# Patient Record
Sex: MALE | Race: WHITE | NOT HISPANIC OR LATINO | Employment: FULL TIME | URBAN - METROPOLITAN AREA
[De-identification: names, ages, dates, MRNs, and addresses within clinical notes are randomized per-mention and may not be internally consistent; named-entity substitution may affect disease eponyms.]

---
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
## 2018-08-16 ENCOUNTER — OFFICE VISIT - HEALTHEAST (OUTPATIENT)
Dept: FAMILY MEDICINE | Facility: CLINIC | Age: 18
End: 2018-08-16

## 2018-08-16 DIAGNOSIS — Z00.00 ENCOUNTER FOR ANNUAL HEALTH EXAMINATION: ICD-10-CM

## 2018-08-16 DIAGNOSIS — Z00.129 ENCOUNTER FOR ROUTINE CHILD HEALTH EXAMINATION WITHOUT ABNORMAL FINDINGS: ICD-10-CM

## 2018-08-16 ASSESSMENT — MIFFLIN-ST. JEOR: SCORE: 1785.3

## 2019-06-19 ENCOUNTER — RECORDS - HEALTHEAST (OUTPATIENT)
Dept: ADMINISTRATIVE | Facility: OTHER | Age: 19
End: 2019-06-19

## 2019-08-08 ENCOUNTER — OFFICE VISIT - HEALTHEAST (OUTPATIENT)
Dept: FAMILY MEDICINE | Facility: CLINIC | Age: 19
End: 2019-08-08

## 2019-08-08 ENCOUNTER — COMMUNICATION - HEALTHEAST (OUTPATIENT)
Dept: SCHEDULING | Facility: CLINIC | Age: 19
End: 2019-08-08

## 2019-08-08 DIAGNOSIS — Z00.129 ENCOUNTER FOR ROUTINE CHILD HEALTH EXAMINATION WITHOUT ABNORMAL FINDINGS: ICD-10-CM

## 2019-08-08 DIAGNOSIS — Z00.00 HEALTH CARE MAINTENANCE: ICD-10-CM

## 2019-08-08 ASSESSMENT — MIFFLIN-ST. JEOR: SCORE: 1746.56

## 2019-08-12 ENCOUNTER — AMBULATORY - HEALTHEAST (OUTPATIENT)
Dept: LAB | Facility: CLINIC | Age: 19
End: 2019-08-12

## 2019-08-12 DIAGNOSIS — Z00.00 HEALTH CARE MAINTENANCE: ICD-10-CM

## 2019-08-12 LAB
ANION GAP SERPL CALCULATED.3IONS-SCNC: 12 MMOL/L (ref 5–18)
BUN SERPL-MCNC: 20 MG/DL (ref 8–22)
CALCIUM SERPL-MCNC: 10.3 MG/DL (ref 8.5–10.5)
CHLORIDE BLD-SCNC: 105 MMOL/L (ref 98–107)
CHOLEST SERPL-MCNC: 162 MG/DL
CO2 SERPL-SCNC: 23 MMOL/L (ref 22–31)
CREAT SERPL-MCNC: 1.27 MG/DL (ref 0.7–1.3)
FASTING STATUS PATIENT QL REPORTED: YES
GFR SERPL CREATININE-BSD FRML MDRD: >60 ML/MIN/1.73M2
GLUCOSE BLD-MCNC: 93 MG/DL (ref 70–125)
HDLC SERPL-MCNC: 56 MG/DL
LDLC SERPL CALC-MCNC: 92 MG/DL
POTASSIUM BLD-SCNC: 4.3 MMOL/L (ref 3.5–5)
SODIUM SERPL-SCNC: 140 MMOL/L (ref 136–145)
TRIGL SERPL-MCNC: 72 MG/DL

## 2019-12-27 ENCOUNTER — OFFICE VISIT - HEALTHEAST (OUTPATIENT)
Dept: FAMILY MEDICINE | Facility: CLINIC | Age: 19
End: 2019-12-27

## 2019-12-27 DIAGNOSIS — L30.9 DERMATITIS: ICD-10-CM

## 2019-12-27 DIAGNOSIS — B27.90 INFECTIOUS MONONUCLEOSIS WITHOUT COMPLICATION, INFECTIOUS MONONUCLEOSIS DUE TO UNSPECIFIED ORGANISM: ICD-10-CM

## 2019-12-29 ENCOUNTER — HOSPITAL ENCOUNTER (OUTPATIENT)
Dept: ULTRASOUND IMAGING | Facility: CLINIC | Age: 19
Discharge: HOME | End: 2019-12-29
Attending: FAMILY MEDICINE

## 2019-12-29 DIAGNOSIS — B27.90 INFECTIOUS MONONUCLEOSIS WITHOUT COMPLICATION, INFECTIOUS MONONUCLEOSIS DUE TO UNSPECIFIED ORGANISM: ICD-10-CM

## 2019-12-30 ENCOUNTER — COMMUNICATION - HEALTHEAST (OUTPATIENT)
Dept: FAMILY MEDICINE | Facility: CLINIC | Age: 19
End: 2019-12-30

## 2020-05-15 ENCOUNTER — COMMUNICATION - HEALTHEAST (OUTPATIENT)
Dept: FAMILY MEDICINE | Facility: CLINIC | Age: 20
End: 2020-05-15

## 2020-05-15 DIAGNOSIS — D22.9 ATYPICAL NEVI: ICD-10-CM

## 2020-06-09 ENCOUNTER — COMMUNICATION - HEALTHEAST (OUTPATIENT)
Dept: FAMILY MEDICINE | Facility: CLINIC | Age: 20
End: 2020-06-09

## 2020-06-09 DIAGNOSIS — J30.2 SEASONAL ALLERGIC RHINITIS, UNSPECIFIED TRIGGER: ICD-10-CM

## 2020-06-22 ENCOUNTER — COMMUNICATION - HEALTHEAST (OUTPATIENT)
Dept: SCHEDULING | Facility: CLINIC | Age: 20
End: 2020-06-22

## 2020-06-22 DIAGNOSIS — Z11.59 SCREENING FOR VIRAL DISEASE: ICD-10-CM

## 2020-07-09 ENCOUNTER — AMBULATORY - HEALTHEAST (OUTPATIENT)
Dept: FAMILY MEDICINE | Facility: CLINIC | Age: 20
End: 2020-07-09

## 2020-07-09 DIAGNOSIS — L55.9 SUNBURN: ICD-10-CM

## 2020-07-10 ENCOUNTER — OFFICE VISIT - HEALTHEAST (OUTPATIENT)
Dept: FAMILY MEDICINE | Facility: CLINIC | Age: 20
End: 2020-07-10

## 2020-07-10 DIAGNOSIS — Z13.220 LIPID SCREENING: ICD-10-CM

## 2020-07-10 DIAGNOSIS — Z00.00 ENCOUNTER FOR ROUTINE HISTORY AND PHYSICAL EXAM FOR MALE: ICD-10-CM

## 2020-07-10 DIAGNOSIS — L55.9 BURN FROM THE SUN: ICD-10-CM

## 2020-07-10 DIAGNOSIS — R17 ELEVATED BILIRUBIN: ICD-10-CM

## 2020-07-10 DIAGNOSIS — Z02.5 SPORTS PHYSICAL: ICD-10-CM

## 2020-07-10 DIAGNOSIS — Z20.822 EXPOSURE TO COVID-19 VIRUS: ICD-10-CM

## 2020-07-10 LAB
ALBUMIN SERPL-MCNC: 4.4 G/DL (ref 3.5–5)
ALP SERPL-CCNC: 57 U/L (ref 45–120)
ALT SERPL W P-5'-P-CCNC: 14 U/L (ref 0–45)
ANION GAP SERPL CALCULATED.3IONS-SCNC: 12 MMOL/L (ref 5–18)
AST SERPL W P-5'-P-CCNC: 17 U/L (ref 0–40)
BILIRUB SERPL-MCNC: 1.2 MG/DL (ref 0–1)
BUN SERPL-MCNC: 17 MG/DL (ref 8–22)
CALCIUM SERPL-MCNC: 9.7 MG/DL (ref 8.5–10.5)
CHLORIDE BLD-SCNC: 100 MMOL/L (ref 98–107)
CHOLEST SERPL-MCNC: 170 MG/DL
CO2 SERPL-SCNC: 26 MMOL/L (ref 22–31)
CREAT SERPL-MCNC: 1.08 MG/DL (ref 0.7–1.3)
ERYTHROCYTE [DISTWIDTH] IN BLOOD BY AUTOMATED COUNT: 11.5 % (ref 11–14.5)
FASTING STATUS PATIENT QL REPORTED: NO
GFR SERPL CREATININE-BSD FRML MDRD: >60 ML/MIN/1.73M2
GLUCOSE BLD-MCNC: 93 MG/DL (ref 70–125)
HCT VFR BLD AUTO: 48.6 % (ref 40–54)
HDLC SERPL-MCNC: 57 MG/DL
HGB BLD-MCNC: 16.1 G/DL (ref 14–18)
LDLC SERPL CALC-MCNC: 98 MG/DL
MCH RBC QN AUTO: 30.7 PG (ref 27–34)
MCHC RBC AUTO-ENTMCNC: 33.1 G/DL (ref 32–36)
MCV RBC AUTO: 93 FL (ref 80–100)
PLATELET # BLD AUTO: 201 THOU/UL (ref 140–440)
PMV BLD AUTO: 6.7 FL (ref 7–10)
POTASSIUM BLD-SCNC: 4 MMOL/L (ref 3.5–5)
PROT SERPL-MCNC: 7.7 G/DL (ref 6–8)
RBC # BLD AUTO: 5.25 MILL/UL (ref 4.4–6.2)
SODIUM SERPL-SCNC: 138 MMOL/L (ref 136–145)
TRIGL SERPL-MCNC: 73 MG/DL
WBC: 5.8 THOU/UL (ref 4–11)

## 2020-07-10 ASSESSMENT — MIFFLIN-ST. JEOR: SCORE: 1716.14

## 2020-07-14 ENCOUNTER — COMMUNICATION - HEALTHEAST (OUTPATIENT)
Dept: FAMILY MEDICINE | Facility: CLINIC | Age: 20
End: 2020-07-14

## 2020-07-18 ENCOUNTER — COMMUNICATION - HEALTHEAST (OUTPATIENT)
Dept: SCHEDULING | Facility: CLINIC | Age: 20
End: 2020-07-18

## 2020-07-19 ENCOUNTER — COMMUNICATION - HEALTHEAST (OUTPATIENT)
Dept: FAMILY MEDICINE | Facility: CLINIC | Age: 20
End: 2020-07-19

## 2020-07-19 DIAGNOSIS — R17 ELEVATED BILIRUBIN: ICD-10-CM

## 2020-12-17 ENCOUNTER — COMMUNICATION - HEALTHEAST (OUTPATIENT)
Dept: FAMILY MEDICINE | Facility: CLINIC | Age: 20
End: 2020-12-17

## 2020-12-21 ENCOUNTER — AMBULATORY - HEALTHEAST (OUTPATIENT)
Dept: FAMILY MEDICINE | Facility: CLINIC | Age: 20
End: 2020-12-21

## 2020-12-21 ENCOUNTER — COMMUNICATION - HEALTHEAST (OUTPATIENT)
Dept: FAMILY MEDICINE | Facility: CLINIC | Age: 20
End: 2020-12-21

## 2021-01-04 ENCOUNTER — AMBULATORY - HEALTHEAST (OUTPATIENT)
Dept: FAMILY MEDICINE | Facility: CLINIC | Age: 21
End: 2021-01-04

## 2021-01-04 DIAGNOSIS — Z80.8 FAMILY HISTORY OF MALIGNANT MELANOMA: ICD-10-CM

## 2021-04-26 ENCOUNTER — COMMUNICATION - HEALTHEAST (OUTPATIENT)
Dept: CARDIOLOGY | Facility: CLINIC | Age: 21
End: 2021-04-26

## 2021-05-29 ENCOUNTER — HEALTH MAINTENANCE LETTER (OUTPATIENT)
Age: 21
End: 2021-05-29

## 2021-05-31 VITALS — WEIGHT: 166.31 LBS | HEIGHT: 70 IN | BODY MASS INDEX: 23.81 KG/M2

## 2021-05-31 NOTE — TELEPHONE ENCOUNTER
Labs were offered but patient declined- offered screening cholesterol and basic panel for kidney function and diabetes.  If patient would like these done they can be done with a lab visit.

## 2021-05-31 NOTE — PROGRESS NOTES
[facility] Well Child Check    ASSESSMENT & PLAN  Seth T Cushing is a 18 y.o. who has normal growth and normal development.    Diagnoses and all orders for this visit:    Encounter for routine child health examination without abnormal findings  -     Hearing Screening  -     Vision Screening  -     PHQ9 Depression Screen    Paperwork filled out today- cleared for sports without restriction  Pt will be starting [employer] in [place]- planning on playing Hockey  Return to clinic in 1 year for a Well Child Check or sooner as needed    IMMUNIZATIONS/LABS  No immunizations due today.    REFERRALS  Dental:  Recommend routine dental care as appropriate.  Other:  No additional referrals were made at this time.    ANTICIPATORY GUIDANCE  I have reviewed age appropriate anticipatory guidance.  Social:  Extracurricular Activities  Parenting:  Homework  Nutrition:  Body Image  Play and Communication:  Organized Sports  Health:  Self Testicular Exam  Safety:  Seat Belts  Sexuality:  STD's    HEALTH HISTORY  Do you have any concerns that you'd like to discuss today?: No concerns       Roomed by: Mariana ELIAS    Refills needed? No    Do you have any forms that need to be filled out? Yes        Do you have any significant health concerns in your family history?: No  Family History   Problem Relation Age of Onset     Breast cancer Mother      Dementia Maternal Grandmother      Dementia Maternal Grandfather      Since your last visit, have there been any major changes in your family, such as a move, job change, separation, divorce, or death in the family?: Yes: Mom is moving   Has a lack of transportation kept you from medical appointments?: No    Home  Who lives in your home?:  Lives with dad, Stays on campus at college  Social History     Social History Narrative     Not on file     Do you have any concerns about losing your housing?: No  Is your housing safe and comfortable?: Yes  Do you have any trouble with sleep?:   No    Education  What school do you child attend?:  [employer]  What grade are you in?:  2nd year college   How do you perform in school (grades, behavior, attention, homework?:  No concerns     Eating  Do you eat regular meals including fruits and vegetables?:  yes  What are you drinking (cow's milk, water, soda, juice, sports drinks, energy drinks, etc)?: cow's milk- whole, water and sports drinks  Have you been worried that you don't have enough food?: No  Do you have concerns about your body or appearance?:  No    Activities  Do you have friends?:  yes  Do you get at least one hour of physical activity per day?:  yes  How many hours a day are you in front of a screen other than for schoolwork (computer, TV, phone)?:  2  What do you do for exercise?:  Hockey, baseball, football golf, working out   Do you have interest/participate in community activities/volunteers/school sports?:  Yes; playing hockey for school     MENTAL HEALTH SCREENING  PHQ-2 Total Score: 0 (8/8/2019 11:00 AM)    PHQ-9 Total Score: 0 (8/8/2019 11:00 AM)      VISION/HEARING  Vision: Completed. See Results  Hearing:  Completed. See Results     Hearing Screening    125Hz 250Hz 500Hz 1000Hz 2000Hz 3000Hz 4000Hz 6000Hz 8000Hz   Right ear:   30 20 20  20 20    Left ear:   30 25 20  20 20       Visual Acuity Screening    Right eye Left eye Both eyes   Without correction: 10/10 10/10 10/8   With correction:      Comments: Plus Lens: Pass: blurring of vision with +2.50 lens glasses      TB Risk Assessment:  The patient and/or parent/guardian answer positive to:  patient and/or parent/guardian answer 'no' to all screening TB questions    Dyslipidemia Risk Screening  Have either of your parents or any of your grandparents had a stroke or heart attack before age 55?: No  Any parents with high cholesterol or currently taking medications to treat?: No     Dental  When was the last time you saw the dentist?: 1-3 months ago   Parent/Guardian declines  "the fluoride varnish application today. Fluoride not applied today.    Patient Active Problem List   Diagnosis     Allergic Rhinitis       Drugs  Does the patient use tobacco/alcohol/drugs?:  no    Safety  Does the patient have any safety concerns (peer or home)?:  no  Does the patient use safety belts, helmets and other safety equipment?:  yes    Sex  Have you ever had sex?:  No    MEASUREMENTS  Height:  5' 9.49\" (1.765 m)  Weight: 165 lb (74.8 kg)  BMI: Body mass index is 24.03 kg/m .  Blood Pressure: 111/55  Growth percentile SmartLinks can only be used for patients less than 20 years old.    PHYSICAL EXAM  Physical Examination: General appearance - alert, well appearing, and in no distress  Mental status - alert, oriented to person, place, and time  Eyes - pupils equal and reactive, extraocular eye movements intact  Ears - bilateral TM's and external ear canals normal  Nose - normal and patent, no erythema, discharge or polyps  Mouth - mucous membranes moist, pharynx normal without lesions  Neck - supple, no significant adenopathy  Chest - clear to auscultation, no wheezes, rales or rhonchi, symmetric air entry  Heart - normal rate, regular rhythm, normal S1, S2, no murmurs, rubs, clicks or gallops  Abdomen - soft, nontender, nondistended, no masses or organomegaly  Back exam - full range of motion, no tenderness, palpable spasm or pain on motion  Neurological - alert, oriented, normal speech, no focal findings or movement disorder noted  Musculoskeletal - no joint tenderness, deformity or swelling  Extremities - peripheral pulses normal, no pedal edema, no clubbing or cyanosis  Skin - normal coloration and turgor, no rashes, no suspicious skin lesions noted    "

## 2021-05-31 NOTE — TELEPHONE ENCOUNTER
Left message to call back for: Questions  Information to relay to patient:  What is mom looking to get done? Hearing, vision, PHQ9, no immunizations due, was ALL done today. If the pt's school is looking for certain lab work, what labs? We can schedule a lab only appt if that is the case.

## 2021-05-31 NOTE — TELEPHONE ENCOUNTER
Left message for Mom Calista asking what lab work they would like done and to call back to schedule lab appt. A full sports physical was already completed.

## 2021-05-31 NOTE — TELEPHONE ENCOUNTER
Called and spoke with Haris. Scheduled for Monday @ 9:30 AM labs pended for future BMP and Lipid.

## 2021-05-31 NOTE — TELEPHONE ENCOUNTER
New Appointment Needed  What is the reason for the visit:  Patients mother is stating that patient was seen today 08/08/19 for a physical and it was not a complete physical labs weren't drawn and nothing else was done. She is wanting to come in and have a full physical before patient leaves [facility] next Friday. She is requesting a phone call back to discuss this in further details..     Provider Preference: Any available  How soon do you need to be seen?: tomorrow  Waitlist offered?: No  Okay to leave a detailed message:  Yes

## 2021-06-01 VITALS — WEIGHT: 174.38 LBS | BODY MASS INDEX: 25.83 KG/M2 | HEIGHT: 69 IN

## 2021-06-03 VITALS — BODY MASS INDEX: 24.44 KG/M2 | HEIGHT: 69 IN | WEIGHT: 165 LBS

## 2021-06-04 VITALS
OXYGEN SATURATION: 97 % | TEMPERATURE: 97.7 F | HEART RATE: 72 BPM | RESPIRATION RATE: 10 BRPM | DIASTOLIC BLOOD PRESSURE: 61 MMHG | HEIGHT: 69 IN | WEIGHT: 160 LBS | BODY MASS INDEX: 23.7 KG/M2 | SYSTOLIC BLOOD PRESSURE: 102 MMHG

## 2021-06-04 VITALS
TEMPERATURE: 98 F | OXYGEN SATURATION: 97 % | DIASTOLIC BLOOD PRESSURE: 60 MMHG | RESPIRATION RATE: 16 BRPM | WEIGHT: 164 LBS | BODY MASS INDEX: 23.88 KG/M2 | HEART RATE: 82 BPM | SYSTOLIC BLOOD PRESSURE: 104 MMHG

## 2021-06-04 NOTE — PROGRESS NOTES
Assessment/Plan:    Infectious mononucleosis without complication, infectious mononucleosis due to unspecified organism  Sore throat and temperature regulation.  Positive Monospot.  He is a competitive college .  Onset of symptoms was approximately 2 weeks ago.  We have a lengthy conversation regarding return to play and the potential for splenic rupture.  We reviewed that most recommendations are to stay out of athletics for 4 weeks from onset of symptoms.  This would return him to hockey on January 10.  We will complete an abdominal ultrasound early next week.  If he does not show splenomegaly, he can return to play 3 weeks from onset of symptoms which would be January 3.  This is consistent with some of the recommendations is able to find through my research.  We discussed weight lifting as a potential cause of rupture as well if he is trying to be active while he is waiting to return to hockey.  He had some injection in his right ear but given lack of pain or discomfort, we chose not to treat.    Dermatitis  Lesion on right hip.  Working diagnosis is atopic dermatitis.  The patient has been applying a moderate to high potency steroid although he has not done so diligently.  We discussed that application of this product twice daily for 10 to 14 days will likely be effective.  If ineffective, does have some qualities consistent with fungal infection.  Discussed potential treatment with clotrimazole or (would require prescription ketoconazole).     Return in about 4 weeks (around 1/24/2020) for recheck if not improving.    Darinel Mohr MD  _______________________________    Chief Complaint   Patient presents with     Follow-up     mono      Subjective: Seth T Cushing is a 19 y.o. year old male who I have not seen in clinic before who presents with the following acute complaint(s):    Headache and sore throat:   - coming and going for the past couple of weeks.   - college .   - he  feels tired today   - sore throat   - fever last night?  Tmax: 99.x .  Subjective fevers   - throat remains painful.   - taking ibuprofen    ROS: Complete review of systems obtained.  Pertinent items are listed above.     The following portions of the patient's history were reviewed and updated as appropriate: allergies, current medications, past medical history and problem list.     Objective:   /60 (Patient Site: Left Arm, Patient Position: Sitting, Cuff Size: Adult Large)   Pulse 82   Temp 98  F (36.7  C) (Oral)   Resp 16   Wt 164 lb (74.4 kg)   SpO2 97% Comment: room air  BMI 23.88 kg/m    Gen.: No acute distress  HEENT: The right TM is mildly injected.  The left TM is gray and dull in appearance.  There is anterior cervical lymphadenopathy.  The posterior pharynx shows tonsillar enlargement and erythema.  No obvious tonsillar exudate.    Cardiac: Regular rate and rhythm, normal S1/S2, no murmurs or gallops  Respiratory: Clear to auscultation bilaterally.  Abdomen: Soft, nondistended.  No obvious hepatosplenomegaly.  The patient is a muscular build and palpation is difficult.  Skin: Right hip with well demarcated irregularly shaped scaly patch.    Sore throat visit at urgent care.  Positive monospot.     No results found for this or any previous visit (from the past 24 hour(s)).  No results found.    Additional History from Old Records Summarized (2): yes  Decision to Obtain Records (1): no  Radiology Tests Summarized or Ordered (1): yes  Labs Reviewed or Ordered (1): yes  Medicine Test Summarized or Ordered (1): no  Independent Review of EKG or X-RAY(2 each): no    This note has been dictated using voice recognition software. Any grammatical or context distortions are unintentional and inherent to the software

## 2021-06-04 NOTE — PATIENT INSTRUCTIONS - HE
For the rash:    - try steroid twice daily for 14 days or less.   - if no progress after 7 days, switch to over the counter clotrimazole.

## 2021-06-08 NOTE — TELEPHONE ENCOUNTER
Requested Prescriptions     Pending Prescriptions Disp Refills     loratadine (CLARITIN) 10 mg tablet  0     Sig: Take 1 tablet (10 mg total) by mouth daily.

## 2021-06-08 NOTE — TELEPHONE ENCOUNTER
Referral Request  Type of referral: Dermatology   Who s requesting: Patient's mother  Why the request: Moles  Have you been seen for this request: Yes  Does patient have a preference on a group/provider? Dr. Boland  Okay to leave a detailed message?  Yes
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
## 2021-06-09 NOTE — TELEPHONE ENCOUNTER
"Patient is calling requesting COVID serologic antibody testing.  NOTE: Serologic testing is a blood test for 'antibodies' which are made at 10-14 days after you have had symptoms of COVID or were exposed and had an asymptomatic infection.  This does NOT test you for 'active' infection or tell you if you are contagious.    Are you a healthcare worker?  No  Do you currently have a cough, fever, body aches, shortness of breath or difficulty breathing?   No  Did you previously have cough, fever, body aches, shortness of breath, or difficulty breathing that have now resolved? Has had previous covid symptoms.   Symptoms began 90 days ago.  Symptoms started > 14 days ago. Lab order placed per SARS-CoV-2 Serology test Standing Order using indication \"Previously symptomatic >14d since onset, currently asymptomatic\" and diagnosis code \"Screening for viral disease\" (Z11.59)    Eliana Whiteside RN  [place] Nurse Advisor        The patient was informed: \"Testing is limited each day and it may take time for testing to be available to everyone who has called. You will receive a call within 48-72 hours to schedule the serology testing. Please confirm the best number to reach you is [phone]. If you have any questions about scheduling, call [phone].\"       "

## 2021-06-11 NOTE — PROGRESS NOTES
[facility] Child Check    ASSESSMENT & PLAN  Seth T Cushing is a 16 y.o. who has normal growth and normal development.    Diagnoses and all orders for this visit:    Encounter for routine history and physical exam for male-normal physical exam at this time.  We discussed immunizations needed prior to school.  He has had meningitis vaccination but not with Bexsero.  First immunization given today and he will return for his second dose    Other orders  -     Meningococcal B (PF)    Return in 1 year.  Forms filled out for school.  Will be starting [employer].  He will be playing sports and is cleared to play at this time.  No sports physical forms are brought in today    IMMUNIZATIONS/LABS  Immunizations were reviewed and orders were placed as appropriate. and I have discussed the risks and benefits of all of the vaccine components with the patient/parents.  All questions have been answered.    REFERRALS  Dental:  Recommend routine dental care as appropriate., The patient has already established care with a dentist.  Other:  No additional referrals were made at this time.    ANTICIPATORY GUIDANCE  I have reviewed age appropriate anticipatory guidance.  Social:  Friends, Peer Pressure, Extracurricular Activities and Changes and Choices  Parenting:  Homework and Confidential Health Care  Nutrition:  Body Image  Play and Communication:  Organized Sports and Appropriate Use of TV  Health:  Acne, Drugs, Smoking, Alcohol, Self Testicular Exam, Activity (>45 min/day), Sleep, Sun Screen and Dental Care  Safety:  Contact Sports and Outdoor Safety Avoiding Sun Exposure  Sexuality:  Safe Sex, STD's and Contraception    HEALTH HISTORY  Do you have any concerns that you'd like to discuss today?: No concerns      Haris is a 16 y.o. male presenting to the clinic today to establish care and for an annual exam. He was previously a patient of Dr. Martin.    Family Hx Breast Cancer/Hx Breast Mass: The mass under his left nipple  has completely resolved. His mother has a history of breast cancer.    Hx Acne: He is not using acne medication anymore    Allergies: He takes loratadine 10 mg daily for seasonal allergies.     Fair Skin/Family Hx Melanoma: His mother has had melanoma. He denies any concerning rashes or moles.     Hx Right Fifth Finger Fx: He broke his right fifth finger when he was 3; he had a splint.     Hx Concussion: He had a concussion in 2010; it happened while tubing. He had symptoms for a couple of days. He did not black out. He followed up with the  and he completed a concussion test and repeat test.     Health Maintenance: He has never been sexually active. He does not drink soda often. His brother and dad have been smokers.     REVIEW OF SYSTEMS:   He denies any history of hospitalizations, asthma, anxiety, depression, exposure to TB, special needs, or adverse reactions to immunizations. He does not use elicit substances or alcohol. He denies any fevers, breathing issues playing sports, heart palpitations, chest pressure, LOC, night sweats, cough, dysphagia, diarrhea, constipation, hematochezia, dysuria, urinary frequency, back pain, or joint pain. All other systems are negative.     PFSH:  He will be going to [place] for college this fall; he plans to study accounting or marketing. He will be moving in September 2017. He is working and playing baseball everyday. He will be playing baseball and hockey at [employer]. He plays center field in baseball.     Roomed by: Lakia ALVARADO CMA    Refills needed? No    Do you have any forms that need to be filled out? Yes     services provided by:  na   /Agency Name  na   Location of  Services:  na     Past Medical History:   Diagnosis Date     Acne      Concussion 08/2010    x1 mild (tubing)       Left breast mass 2016     Seasonal allergies      Past Surgical History:   Procedure Laterality Date     TYMPANOSTOMY TUBE PLACEMENT       WILLAM  TOOTH EXTRACTION       Do you have any significant health concerns in your family history?: No  Family History   Problem Relation Age of Onset     Breast cancer Mother      Dementia Maternal Grandmother      Dementia Maternal Grandfather      Since your last visit, have there been any major changes in your family, such as a move, job change, separation, divorce, or death in the family?: No    Home  Who lives in your home?:  Mom, step-dad, 1 dog sher   Social History     Social History Narrative     Do you have any trouble with sleep?:  No    Education  What school does your child attend?: His grades have always been good; he graduated from [employer].    What grade is your child in?:  freshman in college  How does the patient perform in school (grades, behavior, attention, homework?: Good, no issues     Eating  Does patient eat regular meals including fruits and vegetables?:  yes  What is the patient drinking (cow's milk, water, soda, juice, sports drinks, energy drinks, etc)?: cow's milk- whole and water  Does patient have concerns about body or appearance?:  No    Activities  Does the patient have friends?:  yes  Does the patient get at least one hour of physical activity per day?:  yes  Does the patient have less than 2 hours of screen time per day (aside from homework)?:  yes  What does your child do for exercise?:  Play sports and lift weights   Does the patient have interest/participate in community activities/volunteers/school sports?:  yes    MENTAL HEALTH SCREENING  PHQ-2 Total Score: 0 (7/18/2017 12:00 PM)  No Data Recorded    VISION/HEARING  Vision: Completed. See Results  Hearing:  Completed. See Results     Hearing Screening    125Hz 250Hz 500Hz 1000Hz 2000Hz 3000Hz 4000Hz 6000Hz 8000Hz   Right ear:   25 25 25  25     Left ear:   25 25 25  25        Visual Acuity Screening    Right eye Left eye Both eyes   Without correction: 20/20 20/20 20/20   With correction:          TB Risk  "Assessment:  The patient and/or parent/guardian answer positive to:  patient and/or parent/guardian answer 'no' to all screening TB questions    Dental  Is your child being seen by a dentist?  Yes  Is child seen by dentist?     Yes    Patient Active Problem List   Diagnosis     Allergic Rhinitis       Drugs  Does the patient use tobacco/alcohol/drugs?:  no    Safety  Does the patient have any safety concerns (peer or home)?:  no  Does the patient use safety belts, helmets and other safety equipment?:  yes    Sex  Is the patient sexually active?:  no    MEASUREMENTS  Height:  5' 9.5\" (1.765 m)  Weight: 166 lb 5 oz (75.4 kg)  BMI: Body mass index is 24.21 kg/(m^2).  Blood Pressure: 114/62  Blood pressure percentiles are 23 % systolic and 15 % diastolic based on NHBPEP's 4th Report. Blood pressure percentile targets: 90: 135/89, 95: 139/93, 99 + 5 mmH/106.    Physical Exam:  GENERAL:  Reveals an alert male in NAD.  Vitals:  Per nursing notes.  EYES: PERRLA. Extraocular movements intact. Normal conjunctiva and lids.    ENT:  Hearing grossly normal.  Normal appearance to ears and nose.  Bilateral TM s, external canals, oropharynx normal. Normal lips, gums and teeth.  Normal nasal mucosa, septum and turbinates.  NECK:  Supple, without thyromegaly or mass.  LUNGS:  Clear to auscultation without crackles, wheezes or distress.  Normal respiratory effort.   CV:  Regular rate and rhythm without murmurs, rubs or gallops. No varicosities or edema. Carotids without bruits.   ABDOMEN:  Soft, non-tender, without hepatosplenomegaly, masses, or hernias.    :  Normal scrotum.  Penis circumcised without lesions or discharge.  LYMPH: Normal palpation of neck, groin and axilla.  No lymphadenopathy.  No bruising.  NEURO:  CN II-XII intact, motor & sensory function all intact.  DTR and reflexes normal.  PSYCH:  Alert & oriented with normal mood and affect.   SKIN:  Normal inspection and palpation.  MSK: Normal gait and station.     "   The visit lasted a total of 17 minutes face to face with the patient. Over 50% of the time was spent counseling and educating the patient about his health history, chronic health conditions, medications, and health maintenance.     I, Josephine Duran, am scribing for and in the presence of Dr. Greene.  IErica DO , personally performed the services described in this documentation, as scribed by Josephine Duran in my presence, and it is both accurate and complete.     MEDICATIONS:  Current Outpatient Prescriptions   Medication Sig Dispense Refill     loratadine (CLARITIN) 10 mg tablet Take 10 mg by mouth daily.       No current facility-administered medications for this visit.

## 2021-06-13 NOTE — TELEPHONE ENCOUNTER
I placed an order for flu shot. Order  needs to be faxed to his insurance [employer] - fax  [phone]

## 2021-06-16 NOTE — TELEPHONE ENCOUNTER
Telephone Encounter by Zakiya Urbina CMA at 12/30/2019  7:50 AM     Author: Zakiya Urbina CMA Service: -- Author Type: Medical Assistant    Filed: 12/30/2019  7:51 AM Encounter Date: 12/30/2019 Status: Signed    : Zakiya Urbina CMA (Medical Assistant)       Darinel Mohr MD [facility] [place] [place] [facility] Care Team Pool             Please call patient and confirm he has access to [facility].  If not, give him the following message.     Your spleen is larger than expected.  I recommend you wait an additional week or two (as discussed) before returning to hockey.     Please call/reply with questions.     Darinel Ansari MD

## 2021-06-17 NOTE — PATIENT INSTRUCTIONS - HE
Patient Instructions by Mariana Ward CMA at 8/8/2019 10:50 AM     Author: Mariana Ward CMA Service: -- Author Type: Certified Medical Assistant    Filed: 8/8/2019 11:01 AM Encounter Date: 8/8/2019 Status: Signed    : Mariana Ward CMA (Certified Medical Assistant)         Patient Education             [facility] Patient Handout   18 to 21 Year Visits     Your Daily Life    Visit the dentist at least twice a year.    Protect your hearing at work, home, and concerts.    Eat a variety of healthy foods.    Eat breakfast every morning.    Drink plenty of water.    Make sure to get enough calcium.    Have 3 or more servings of low-fat (1%) or fat-free milk and other low-fat dairy products each day.    Aim for 1 hour of vigorous physical activity.    Be proud of yourself when you do something well.  Healthy Behavior Choices    Support friends who choose not to use drugs, alcohol, tobacco, steroids, or diet pills.    If you use drugs or alcohol, you can talk to us about it. We can help you with quitting or cutting down on your use.    Make healthy decisions about your sexual behavior.    If you are sexually active, always practice safe sex. Always use a condom to prevent STIs.    All sexual activity should be something you want. No one should ever force or try to convince you.    Find safe activities at school and in the community. Violence and Injuries    Do not drink and drive or ride in a vehicle with someone who has been using drugs or alcohol.    If you feel unsafe driving or riding with someone, call someone you trust to drive you.    Always wear a seat belt in the car.    Know the rules for safe driving.    Never allow physical harm of yourself or others at home or school.    Always deal with conflict using nonviolence.    Remember that healthy dating relationships are built on respect and that saying no is OK.    Fighting and carrying weapons can be dangerous.  Your Feelings    Figure out healthy ways  to deal with stress.    Try your best to solve problems and make decisions on your own.    Most people have daily ups and downs. But if you are feeling sad, depressed, nervous, irritable, hopeless, or angry, talk with me or another health professional.    We understand sexuality is an important part of your development. If you have any questions or concerns, we are here for you. School and Friends    Take responsibility for being organized enough to succeed in work or school.    Find new activities you enjoy.    Consider volunteering and helping others in the community on an issue that interests or concerns you.    Form healthy friendships and find fun, safe things to do with friends.    As you get older, making and keeping friends is important. You may find that you drift away from some of your old friends--thats normal.    Evaluate your friendships and keep those that are healthy.    It is still important to stay connected with your family.

## 2021-06-19 NOTE — PROGRESS NOTES
[facility] Well Child Check    ASSESSMENT & PLAN  Seth T Cushing is a 17 y.o. who has normal growth and normal development.    Diagnoses and all orders for this visit:    Encounter for routine child health examination without abnormal findings  -     Hearing Screening  -     Vision Screening  -     PHQ9 Depression Screen    Encounter for annual health examination    Pt has no acute concerns  Is leaving for [place] in the near future    Return to clinic in 1 year for a Well Child Check or sooner as needed    IMMUNIZATIONS/LABS  Immunizations were reviewed and orders were placed as appropriate.    REFERRALS  Dental:  Recommend routine dental care as appropriate.  Other:  No additional referrals were made at this time.    ANTICIPATORY GUIDANCE  I have reviewed age appropriate anticipatory guidance.  Social:  Friends and Extracurricular Activities  Parenting:  Confidential Health Care  Nutrition:  Body Image  Play and Communication:  Organized Sports  Health:  Self Testicular Exam  Safety:  Bike/Motorcycle Helmets  Sexuality:  STD's    HEALTH HISTORY  Do you have any concerns that you'd like to discuss today?: No concerns       Roomed by: Hiwot ELIAS CMA    Refills needed? No    Do you have any forms that need to be filled out? No        Do you have any significant health concerns in your family history?: No  Family History   Problem Relation Age of Onset     Breast cancer Mother      Dementia Maternal Grandmother      Dementia Maternal Grandfather      Since your last visit, have there been any major changes in your family, such as a move, job change, separation, divorce, or death in the family?: No  Has a lack of transportation kept you from medical appointments?: No    Home  Who lives in your home?:  Mom, step dad, and self  Social History     Social History Narrative     Do you have any concerns about losing your housing?: No  Is your housing safe and comfortable?: Yes  Do you have any trouble with sleep?:   No    Education  What school do you child attend?:  college  What grade are you in?:  college  How do you perform in school (grades, behavior, attention, homework?: good     Eating  Do you eat regular meals including fruits and vegetables?:  yes  What are you drinking (cow's milk, water, soda, juice, sports drinks, energy drinks, etc)?: cow's milk- whole, water and sports drinks  Have you been worried that you don't have enough food?: No  Do you have concerns about your body or appearance?:  No    Activities  Do you have friends?:  yes  Do you get at least one hour of physical activity per day?:  yes  How many hours a day are you in front of a screen other than for schoolwork (computer, TV, phone)?:  4-5  What do you do for exercise?:  Lift weights, hockey, football, baseball  Do you have interest/participate in community activities/volunteers/school sports?:  yes, Lift weights, hockey, football, baseball    MENTAL HEALTH SCREENING  PHQ-2 Total Score: 0 (8/17/2018 11:00 AM)  PHQ-9 Total Score: 0 (8/17/2018 11:00 AM)    VISION/HEARING  Vision: Completed. See Results  Hearing:  Completed. See Results     Hearing Screening    125Hz 250Hz 500Hz 1000Hz 2000Hz 3000Hz 4000Hz 6000Hz 8000Hz   Right ear:   25 20 20  20 20    Left ear:   25 20 20  20 20       Visual Acuity Screening    Right eye Left eye Both eyes   Without correction: 10/10 10/8 10/8   With correction:      Comments: Vision plus lens: passed      TB Risk Assessment:  The patient and/or parent/guardian answer positive to:  patient and/or parent/guardian answer 'no' to all screening TB questions    Dyslipidemia Risk Screening  Have either of your parents or any of your grandparents had a stroke or heart attack before age 55?: No  Any parents with high cholesterol or currently taking medications to treat?: No     Dental  When was the last time you saw the dentist?: Less than 30 days ago.  Approx date (required): 08/09/18   Parent/Guardian declines the fluoride  "varnish application today. Fluoride not applied today.    Patient Active Problem List   Diagnosis     Allergic Rhinitis       Drugs  Does the patient use tobacco/alcohol/drugs?:  no    Safety  Does the patient have any safety concerns (peer or home)?:  no  Does the patient use safety belts, helmets and other safety equipment?:  yes    Sex  Pt declined STD testing- has no concerns    MEASUREMENTS  Height:  5' 9.25\" (1.759 m)  Weight: 174 lb 6 oz (79.1 kg)  BMI: Body mass index is 25.57 kg/(m^2).  Blood Pressure: 106/57  Blood pressure percentiles are 6 % systolic and 9 % diastolic based on the 2017 AAP Clinical Practice Guideline. Blood pressure percentile targets: 90: 135/82, 95: 139/86, 95 + 12 mmH/98.    PHYSICAL EXAM  Physical Examination: GENERAL ASSESSMENT: active, alert, no acute distress, well hydrated, well nourished  SKIN: no lesions, jaundice, petechiae, pallor, cyanosis, ecchymosis  HEAD: Atraumatic, normocephalic  EYES: PERRL  EOM intact  EARS: bilateral TM's and external ear canals normal  NOSE: nasal mucosa, septum, turbinates normal bilaterally  MOUTH: mucous membranes moist and normal tonsils  NECK: supple, full range of motion, no mass, normal lymphadenopathy, no thyromegaly  CHEST: clear to auscultation, no wheezes, rales, or rhonchi, no tachypnea, retractions, or cyanosis  LUNGS: Respiratory effort normal, clear to auscultation, normal breath sounds bilaterally  HEART: Regular rate and rhythm, normal S1/S2, no murmurs, normal pulses and capillary fill  ABDOMEN: Normal bowel sounds, soft, nondistended, no mass, no organomegaly.  GENITALIA: not examined  SPINE: Inspection of back is normal, No tenderness noted  EXTREMITY: Normal muscle tone. All joints with full range of motion. No deformity or tenderness.  NEURO: gross motor exam normal by observation    "

## 2021-06-20 NOTE — LETTER
Letter by Erica Greene DO at      Author: Erica Greene DO Service: -- Author Type: --    Filed:  Encounter Date: 7/14/2020 Status: (Other)         Seth T Cushing  [id number]  [facility] [address]             July 14, 2020         Dear Mr. Cushing,    Below are the results from your recent visit:    Resulted Orders   Comprehensive Metabolic Panel   Result Value Ref Range    Sodium 138 136 - 145 mmol/L    Potassium 4.0 3.5 - 5.0 mmol/L    Chloride 100 98 - 107 mmol/L    CO2 26 22 - 31 mmol/L    Anion Gap, Calculation 12 5 - 18 mmol/L    Glucose 93 70 - 125 mg/dL    BUN 17 8 - 22 mg/dL    Creatinine 1.08 0.70 - 1.30 mg/dL    GFR MDRD Af Amer >60 >60 mL/min/1.73m2    GFR MDRD Non Af Amer >60 >60 mL/min/1.73m2    Bilirubin, Total 1.2 (H) 0.0 - 1.0 mg/dL    Calcium 9.7 8.5 - 10.5 mg/dL    Protein, Total 7.7 6.0 - 8.0 g/dL    Albumin 4.4 3.5 - 5.0 g/dL    Alkaline Phosphatase 57 45 - 120 U/L    AST 17 0 - 40 U/L    ALT 14 0 - 45 U/L    Narrative    Fasting Glucose reference range is 70-99 mg/dL per  American Diabetes Association (ADA) guidelines.   HM2(CBC w/o Differential)   Result Value Ref Range    WBC 5.8 4.0 - 11.0 thou/uL    RBC 5.25 4.40 - 6.20 mill/uL    Hemoglobin 16.1 14.0 - 18.0 g/dL    Hematocrit 48.6 40.0 - 54.0 %    MCV 93 80 - 100 fL    MCH 30.7 27.0 - 34.0 pg    MCHC 33.1 32.0 - 36.0 g/dL    RDW 11.5 11.0 - 14.5 %    Platelets 201 140 - 440 thou/uL    MPV 6.7 (L) 7.0 - 10.0 fL   Lipid Cascade RANDOM   Result Value Ref Range    Cholesterol 170 <=199 mg/dL    Triglycerides 73 <=149 mg/dL    HDL Cholesterol 57 >=40 mg/dL    LDL Calculated 98 <=129 mg/dL    Patient Fasting > 8hrs? No    COVID-19 Virus (Coronavirus) Antibody & Titer Reflex   Result Value Ref Range    COVID-19 Antibody Screen Negative       Comment:      No COVID-19 antibodies detected.  Patients within 10 days of symptom onset for  COVID-19 may not produce sufficient levels of detectable antibodies.  Immunocompromised COVID-19  patients may take longer to develop antibodies.    COVID-19 IgG Titer Not Applicable       Comment:      Qualitative screen for total antibodies to COVID-19 (SARS-CoV-2) with  semi-quantitative measurement of IgG COVID-19 antibodies by endpoint titer.  COVID-19 antibodies may be elevated due to a past or current infection.  Negative results do not rule out COVID-19 infection.  Results from antibody  testing should not be used as the sole basis to diagnose or exclude SARS-CoV-2  infection or to inform infection status.  COVID-19 PCR test should be ordered  if current infection is suspected.  False positive results may occur in rare  cases due to cross-reacting antibodies.  This test was developed and its performance characteristics determined by the  [facility] ([facility]),  which is regulated under CLIA as qualified to perform high-complexity testing.  This test has not been reviewed by the FDA.  Testing performed by [facility], [facility], [address], [address], [place]   [address]       Negative for COVID-19 antibodies     Please call with questions or contact us using [facility].    Sincerely,        Electronically signed by Erica Greene DO

## 2021-06-20 NOTE — LETTER
Letter by Vitaliy Gibbons LPN at      Author: Vitaliy Gibbons LPN Service: -- Author Type: --    Filed:  Encounter Date: 7/18/2020 Status: (Other)       7/18/2020        Seth T Cushing [address] [place] [address]    COVID-19 Antibody Screen   Date Value Ref Range Status   07/10/2020 Negative  Final     Comment:     No COVID-19 antibodies detected.  Patients within 10 days of symptom onset for  COVID-19 may not produce sufficient levels of detectable antibodies.  Immunocompromised COVID-19 patients may take longer to develop antibodies.     COVID-19 IgG Titer   Date Value Ref Range Status   07/10/2020 Not Applicable  Final     Comment:     Qualitative screen for total antibodies to COVID-19 (SARS-CoV-2) with  semi-quantitative measurement of IgG COVID-19 antibodies by endpoint titer.  COVID-19 antibodies may be elevated due to a past or current infection.  Negative results do not rule out COVID-19 infection.  Results from antibody  testing should not be used as the sole basis to diagnose or exclude SARS-CoV-2  infection or to inform infection status.  COVID-19 PCR test should be ordered  if current infection is suspected.  False positive results may occur in rare  cases due to cross-reacting antibodies.  This test was developed and its performance characteristics determined by the  [facility] ([facility]),  which is regulated under CLIA as qualified to perform high-complexity testing.  This test has not been reviewed by the FDA.  Testing performed by [facility], [facility], [address], [address], [place] [address]       No results found for: [id number]    You have tested NEGATIVE for COVID-19 antibodies. This suggests you have not had or been exposed to COVID-19. But it does not mean that for sure.    The test finds antibodies in most people 10 days after they get sick. For some people, it takes  longer than 10 days for antibodies to show up. Others may never show antibodies against COVID-19, especially if they have weak immune systems.    If you have COVID-19 symptoms now, please stay home and away from others.     Your current symptoms may or may not be COVID-19.     What is antibody testing?  This is a kind of blood test. We take a small sample of your blood, and then test it for something called antibodies.   Your body makes antibodies to fight infection. If your blood has antibodies for a certain germ, it means youve been infected with that germ in the past.   Sometimes, antibodies stay in your body for years after youve had the infection. They can be there even if the germ didnt make you sick. They are a sign that your body fought off the infection.  Will this test find antibodies in everyone whos had COVID-19?  No. The test finds antibodies in most people 10 days after they get sick. For some people, it takes longer than 10 days for antibodies to show up. Others may never show antibodies against COVID-19, especially if they have weak immune systems.  What are the signs of COVID-19?  Signs of COVID-19 can appear from 2 to 14 days (up to 2 weeks) after youre infected. Some people have no symptoms or only mild symptoms. Others get very sick. The most common symptoms are:      Cough    Shortness of breath or trouble breathing    Or at least 2 of these symptoms:      Fever    Chills    Repeated shaking with chills    Muscle pain    Headache    Sore throat    Losing your sense of taste or smell    You may have other symptoms. Please contact your doctor or clinic for any symptoms that worry you.    Where can I get more information?     To learn the [facility] guidelines for staying home, please visit the [facility] website at [email or web address]    To learn more about COVID-19 and how to care for yourself at home, please visit the CDC website at  https://www.cdc.gov/coronavirus/2019-ncov/about/steps-when-sick.html    For more options for care at [facility], please visit our website at [email or web address]    [place] Department of [facility] ([facility]) COVID-19 Hotline:  [phone]

## 2021-06-21 NOTE — LETTER
Letter by Erica Greene DO at      Author: Erica Greene DO Service: -- Author Type: --    Filed:  Encounter Date: 12/17/2020 Status: (Other)          December 17, 2020     Patient: Seth T Cushing   YOB: 2000   Date of Visit: 12/17/2020       To Whom it May Concern:     Seth Cushing was seen at  [place], [place] on 10/27/2020 at my recommendation to receive influenza vaccination. 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  It was brought to my attention that he was charged for this influenza vaccination which should be part of his wellness and preventative labs that should be covered by his insurance.  Please accept this letter as a referral for vaccination so that he can be reimbursed.    If you have any questions or concerns, please don't hesitate to call.    Sincerely,         Electronically signed by Erica Greene DO

## 2021-07-03 NOTE — ADDENDUM NOTE
Addendum Note by Lisa Grimes DO at 7/10/2020  3:40 PM     Author: Lisa Grimes DO Service: -- Author Type: Physician    Filed: 7/21/2020  6:55 AM Encounter Date: 7/10/2020 Status: Signed    : Lisa Grimes DO (Physician)    Addended by: LISA GRIEMS on: 7/21/2020 06:55 AM        Modules accepted: Orders

## 2021-07-29 ENCOUNTER — OFFICE VISIT (OUTPATIENT)
Dept: FAMILY MEDICINE | Facility: CLINIC | Age: 21
End: 2021-07-29
Payer: COMMERCIAL

## 2021-07-29 VITALS
TEMPERATURE: 97.6 F | HEIGHT: 70 IN | SYSTOLIC BLOOD PRESSURE: 106 MMHG | BODY MASS INDEX: 23.62 KG/M2 | WEIGHT: 165 LBS | HEART RATE: 60 BPM | DIASTOLIC BLOOD PRESSURE: 61 MMHG

## 2021-07-29 DIAGNOSIS — Z00.00 ENCOUNTER FOR ROUTINE HISTORY AND PHYSICAL EXAM FOR MALE: Primary | ICD-10-CM

## 2021-07-29 DIAGNOSIS — J30.2 SEASONAL ALLERGIC RHINITIS, UNSPECIFIED TRIGGER: ICD-10-CM

## 2021-07-29 DIAGNOSIS — Z11.59 ENCOUNTER FOR HEPATITIS C SCREENING TEST FOR LOW RISK PATIENT: ICD-10-CM

## 2021-07-29 DIAGNOSIS — Z11.4 SCREENING FOR HIV WITHOUT PRESENCE OF RISK FACTORS: ICD-10-CM

## 2021-07-29 DIAGNOSIS — Z13.220 LIPID SCREENING: ICD-10-CM

## 2021-07-29 LAB
ALBUMIN SERPL-MCNC: 4.4 G/DL (ref 3.5–5)
ALP SERPL-CCNC: 94 U/L (ref 45–120)
ALT SERPL W P-5'-P-CCNC: 20 U/L (ref 0–45)
ANION GAP SERPL CALCULATED.3IONS-SCNC: 11 MMOL/L (ref 5–18)
AST SERPL W P-5'-P-CCNC: 20 U/L (ref 0–40)
BILIRUB SERPL-MCNC: 2.6 MG/DL (ref 0–1)
BUN SERPL-MCNC: 14 MG/DL (ref 8–22)
CALCIUM SERPL-MCNC: 10.3 MG/DL (ref 8.5–10.5)
CHLORIDE BLD-SCNC: 102 MMOL/L (ref 98–107)
CHOLEST SERPL-MCNC: 142 MG/DL
CO2 SERPL-SCNC: 26 MMOL/L (ref 22–31)
CREAT SERPL-MCNC: 1.36 MG/DL (ref 0.7–1.3)
ERYTHROCYTE [DISTWIDTH] IN BLOOD BY AUTOMATED COUNT: 12 % (ref 10–15)
FASTING STATUS PATIENT QL REPORTED: YES
GFR SERPL CREATININE-BSD FRML MDRD: 73 ML/MIN/1.73M2
GLUCOSE BLD-MCNC: 103 MG/DL (ref 70–125)
HCT VFR BLD AUTO: 48 % (ref 40–53)
HCV AB SERPL QL IA: NEGATIVE
HDLC SERPL-MCNC: 57 MG/DL
HGB BLD-MCNC: 16.6 G/DL (ref 13.3–17.7)
HIV 1+2 AB+HIV1 P24 AG SERPL QL IA: NEGATIVE
LDLC SERPL CALC-MCNC: 70 MG/DL
MCH RBC QN AUTO: 30.5 PG (ref 26.5–33)
MCHC RBC AUTO-ENTMCNC: 34.6 G/DL (ref 31.5–36.5)
MCV RBC AUTO: 88 FL (ref 78–100)
PLATELET # BLD AUTO: 203 10E3/UL (ref 150–450)
POTASSIUM BLD-SCNC: 4.1 MMOL/L (ref 3.5–5)
PROT SERPL-MCNC: 7.5 G/DL (ref 6–8)
RBC # BLD AUTO: 5.44 10E6/UL (ref 4.4–5.9)
SODIUM SERPL-SCNC: 139 MMOL/L (ref 136–145)
TRIGL SERPL-MCNC: 77 MG/DL
WBC # BLD AUTO: 5 10E3/UL (ref 4–11)

## 2021-07-29 PROCEDURE — 80053 COMPREHEN METABOLIC PANEL: CPT | Performed by: FAMILY MEDICINE

## 2021-07-29 PROCEDURE — 36415 COLL VENOUS BLD VENIPUNCTURE: CPT | Performed by: FAMILY MEDICINE

## 2021-07-29 PROCEDURE — 86803 HEPATITIS C AB TEST: CPT | Performed by: FAMILY MEDICINE

## 2021-07-29 PROCEDURE — 85027 COMPLETE CBC AUTOMATED: CPT | Performed by: FAMILY MEDICINE

## 2021-07-29 PROCEDURE — 80061 LIPID PANEL: CPT | Performed by: FAMILY MEDICINE

## 2021-07-29 PROCEDURE — 87389 HIV-1 AG W/HIV-1&-2 AB AG IA: CPT | Performed by: FAMILY MEDICINE

## 2021-07-29 PROCEDURE — 99395 PREV VISIT EST AGE 18-39: CPT | Performed by: FAMILY MEDICINE

## 2021-07-29 RX ORDER — LORATADINE 10 MG/1
10 TABLET ORAL DAILY
Qty: 90 TABLET | Refills: 1 | Status: SHIPPED | OUTPATIENT
Start: 2021-07-29 | End: 2022-03-25

## 2021-07-29 ASSESSMENT — MIFFLIN-ST. JEOR: SCORE: 1746.75

## 2021-07-29 NOTE — PROGRESS NOTES
SUBJECTIVE:   CC: Seth T Cushing is an 20 year old male who presents for preventative health visit.     Patient has been advised of split billing requirements and indicates understanding: Yes  HPI    Answers for HPI/ROS submitted by the patient on 7/22/2021  Frequency of exercise:: 6-7 days/week  Getting at least 3 servings of Calcium per day:: Yes  Diet:: Regular (no restrictions)  Taking medications regularly:: Yes  Medication side effects:: None  Bi-annual eye exam:: NO  Dental care twice a year:: Yes  Sleep apnea or symptoms of sleep apnea:: None  Additional concerns today:: No  Duration of exercise:: 45-60 minutes    Today's PHQ-2 Score:   PHQ-2 ( 1999 [employer]) 7/29/2021   Q1: Little interest or pleasure in doing things 0   Q2: Feeling down, depressed or hopeless 0   PHQ-2 Score 0   Q1: Little interest or pleasure in doing things -   Q2: Feeling down, depressed or hopeless -   PHQ-2 Score -       Abuse: Current or Past(Physical, Sexual or Emotional)- No  Do you feel safe in your environment? Yes    Have you ever done Advance Care Planning? (For example, a Health Directive, POLST, or a discussion with a medical provider or your loved ones about your wishes): No, advance care planning information given to patient to review.  Patient plans to discuss their wishes with loved ones or provider.      Social History     Tobacco Use     Smoking status: Passive Smoke Exposure - Never Smoker     Smokeless tobacco: Never Used     Tobacco comment: father smokes exposed to 2nd hand smoke   Substance Use Topics     Alcohol use: Not on file     Alcohol Use 7/29/2021   Prescreen: >3 drinks/day or >7 drinks/week? -   Prescreen: >3 drinks/day or >7 drinks/week? Yes     Last PSA: No results found for: PSA    Reviewed orders with patient. Reviewed health maintenance and updated orders accordingly - Yes  Lab work is in process    Reviewed and updated as needed this visit by clinical staff  Tobacco  Allergies  Meds  Problems   "Med Hx  Surg Hx  Fam Hx          Reviewed and updated as needed this visit by Provider  Tobacco  Allergies  Meds  Problems  Med Hx  Surg Hx  Fam Hx           OBJECTIVE:   /61   Pulse 60   Temp 97.6  F (36.4  C) (Oral)   Ht 1.765 m (5' 9.5\")   Wt 74.8 kg (165 lb)   BMI 24.02 kg/m      Physical Exam  GENERAL: healthy, alert and no distress  EYES: Eyes grossly normal to inspection, PERRL and conjunctivae and sclerae normal  HENT: ear canals and TM's normal, nose and mouth without ulcers or lesions, clear fluid behind left TM  NECK: no adenopathy, no asymmetry, masses, or scars and thyroid normal to palpation  RESP: lungs clear to auscultation - no rales, rhonchi or wheezes  CV: regular rate and rhythm, normal S1 S2, no S3 or S4, no murmur, click or rub  ABDOMEN: soft, nontender  MS: no gross musculoskeletal defects noted, no edema  SKIN: no suspicious lesions or rashes  NEURO: Normal strength and tone, mentation intact and speech normal  PSYCH: mentation appears normal, affect normal/bright    ASSESSMENT/PLAN:   1. Encounter for routine history and physical exam for male  Reviewed and updated patient's past medical, surgical, family, social history, along with current medications and allergies.  Updated patient's health maintenance as further outlined below.  Full blood work performed last year, however patient and his mother requesting repeat.  Discussed okay to change to every 3 to 5-year routine blood work given age and lack of comorbid conditions.  Okay to participate in sports without restrictions.  Paperwork completed and returned to patient.  COUNSELING:   Reviewed preventive health counseling, as reflected in patient instructions  Special attention given to:        Regular exercise       Healthy diet/nutrition       Vision screening       Alcohol Use        Safe sex practices/STD prevention       Consider Hep C screening for all patients one time for ages 18-79 years       HIV screeninx " "in teen years, 1x in adult years, and at intervals if high risk    Estimated body mass index is 24.02 kg/m  as calculated from the following:    Height as of this encounter: 1.765 m (5' 9.5\").    Weight as of this encounter: 74.8 kg (165 lb).     - Comprehensive metabolic panel (BMP + Alb, Alk Phos, ALT, AST, Total. Bili, TP); Future  - CBC with platelets; Future    2. Seasonal allergic rhinitis, unspecified trigger  Claritin refilled, also recommended using intranasal Flonase given clear fluid seen behind left TM.  - loratadine (CLARITIN) 10 MG tablet; Take 1 tablet (10 mg) by mouth daily  Dispense: 90 tablet; Refill: 1    3. Lipid screening  - Lipid Profile (Chol, Trig, HDL, LDL calc); Future    4. Screening for HIV without presence of risk factors  - HIV Antigen Antibody Combo; Future    5. Encounter for hepatitis C screening test for low risk patient  - Hepatitis C antibody; Future        Bushra Pettit DO  [facility]  "

## 2021-09-18 ENCOUNTER — HEALTH MAINTENANCE LETTER (OUTPATIENT)
Age: 21
End: 2021-09-18

## 2021-12-28 ENCOUNTER — E-VISIT (OUTPATIENT)
Dept: FAMILY MEDICINE | Facility: CLINIC | Age: 21
End: 2021-12-28
Payer: COMMERCIAL

## 2021-12-28 DIAGNOSIS — R52 BODY ACHES: Primary | ICD-10-CM

## 2021-12-28 PROCEDURE — 99207 PR NO CHARGE LOS: CPT | Performed by: FAMILY MEDICINE

## 2022-01-08 ENCOUNTER — HEALTH MAINTENANCE LETTER (OUTPATIENT)
Age: 22
End: 2022-01-08

## 2022-01-10 ENCOUNTER — TRANSFERRED RECORDS (OUTPATIENT)
Dept: HEALTH INFORMATION MANAGEMENT | Facility: CLINIC | Age: 22
End: 2022-01-10
Payer: COMMERCIAL

## 2022-03-24 DIAGNOSIS — J30.2 SEASONAL ALLERGIC RHINITIS, UNSPECIFIED TRIGGER: ICD-10-CM

## 2022-03-25 RX ORDER — LORATADINE 10 MG/1
TABLET ORAL
Qty: 90 TABLET | Refills: 1 | Status: SHIPPED | OUTPATIENT
Start: 2022-03-25

## 2022-06-06 NOTE — LETTER
Letter by Darinel Mohr MD at      Author: Darinel Mohr MD Service: -- Author Type: --    Filed:  Encounter Date: 12/27/2019 Status: Signed         December 27, 2019     Patient: Seth T Cushing   YOB: 2000   Date of Visit: 12/27/2019       To Whom it May Concern:    Seth Cushing was seen in my clinic on 12/27/2019.  He was diagnosed with infectious mononucleosis yesterday.  I suspect he has had symptoms for the past couple of weeks.  In general this condition requires avoidance of contact sports for 4 weeks because of concern for splenic rupture.  We will complete an ultrasound to evaluate his spleen in the next week.  If his ultrasound shows a normal-sized spleen, I believe he would be okay to return to athletics in 7 days from today (approximately January 3).  If his spleen is shown to be enlarged, he should stay out of hockey for the next couple of weeks with a return to hockey January 10.    If you have any questions or concerns, please don't hesitate to call.    Sincerely,         Electronically signed by Darinel Mohr MD        No Vaccines Administered. Retired

## 2022-11-20 ENCOUNTER — HEALTH MAINTENANCE LETTER (OUTPATIENT)
Age: 22
End: 2022-11-20

## 2024-01-28 ENCOUNTER — HEALTH MAINTENANCE LETTER (OUTPATIENT)
Age: 24
End: 2024-01-28